# Patient Record
Sex: FEMALE | Race: WHITE | Employment: FULL TIME | URBAN - METROPOLITAN AREA
[De-identification: names, ages, dates, MRNs, and addresses within clinical notes are randomized per-mention and may not be internally consistent; named-entity substitution may affect disease eponyms.]

---
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
## 2019-05-02 ENCOUNTER — RECORDS - HEALTHEAST (OUTPATIENT)
Dept: LAB | Facility: CLINIC | Age: 19
End: 2019-05-02

## 2019-05-03 LAB — 25(OH)D3 SERPL-MCNC: 41.5 NG/ML (ref 30–80)

## 2019-12-10 ENCOUNTER — RECORDS - HEALTHEAST (OUTPATIENT)
Dept: LAB | Facility: CLINIC | Age: 19
End: 2019-12-10

## 2019-12-11 LAB
C TRACH DNA SPEC QL PROBE+SIG AMP: NEGATIVE
N GONORRHOEA DNA SPEC QL NAA+PROBE: NEGATIVE

## 2020-08-18 ENCOUNTER — RECORDS - HEALTHEAST (OUTPATIENT)
Dept: LAB | Facility: CLINIC | Age: 20
End: 2020-08-18

## 2020-08-18 LAB — LEVETIRACETAM (KEPPRA): 14.1 UG/ML (ref 6–46)

## 2021-02-03 ENCOUNTER — RECORDS - HEALTHEAST (OUTPATIENT)
Dept: LAB | Facility: CLINIC | Age: 21
End: 2021-02-03

## 2021-02-05 LAB — BACTERIA SPEC CULT: NORMAL

## 2021-04-12 ENCOUNTER — RECORDS - HEALTHEAST (OUTPATIENT)
Dept: LAB | Facility: CLINIC | Age: 21
End: 2021-04-12

## 2021-04-15 LAB
C TRACH DNA SPEC QL PROBE+SIG AMP: NEGATIVE
N GONORRHOEA DNA SPEC QL NAA+PROBE: NEGATIVE

## 2021-12-19 ENCOUNTER — HOSPITAL ENCOUNTER (EMERGENCY)
Facility: CLINIC | Age: 21
Discharge: HOME | End: 2021-12-19
Attending: EMERGENCY MEDICINE | Admitting: EMERGENCY MEDICINE
Payer: COMMERCIAL

## 2021-12-19 VITALS — BODY MASS INDEX: 19.49 KG/M2 | WEIGHT: 110 LBS | HEIGHT: 63 IN

## 2021-12-19 DIAGNOSIS — G40.909 SEIZURE DISORDER (H): ICD-10-CM

## 2021-12-19 LAB
ANION GAP SERPL CALCULATED.3IONS-SCNC: 9 MMOL/L (ref 3–14)
BASOPHILS # BLD AUTO: 0 10E3/UL (ref 0–0.2)
BASOPHILS NFR BLD AUTO: 0 %
BUN SERPL-MCNC: 10 MG/DL (ref 7–30)
CALCIUM SERPL-MCNC: 8.5 MG/DL (ref 8.5–10.1)
CHLORIDE BLD-SCNC: 111 MMOL/L (ref 94–109)
CO2 SERPL-SCNC: 21 MMOL/L (ref 20–32)
CREAT SERPL-MCNC: 0.63 MG/DL (ref 0.52–1.04)
EOSINOPHIL # BLD AUTO: 0 10E3/UL (ref 0–0.7)
EOSINOPHIL NFR BLD AUTO: 0 %
ERYTHROCYTE [DISTWIDTH] IN BLOOD BY AUTOMATED COUNT: 12.6 % (ref 10–15)
GFR SERPL CREATININE-BSD FRML MDRD: >90 ML/MIN/1.73M2
GLUCOSE BLD-MCNC: 85 MG/DL (ref 70–99)
HCT VFR BLD AUTO: 41.4 % (ref 35–47)
HGB BLD-MCNC: 13.1 G/DL (ref 11.7–15.7)
IMM GRANULOCYTES # BLD: 0 10E3/UL
IMM GRANULOCYTES NFR BLD: 0 %
LYMPHOCYTES # BLD AUTO: 0.6 10E3/UL (ref 0.8–5.3)
LYMPHOCYTES NFR BLD AUTO: 6 %
MCH RBC QN AUTO: 29 PG (ref 26.5–33)
MCHC RBC AUTO-ENTMCNC: 31.6 G/DL (ref 31.5–36.5)
MCV RBC AUTO: 92 FL (ref 78–100)
MONOCYTES # BLD AUTO: 0.5 10E3/UL (ref 0–1.3)
MONOCYTES NFR BLD AUTO: 5 %
NEUTROPHILS # BLD AUTO: 9 10E3/UL (ref 1.6–8.3)
NEUTROPHILS NFR BLD AUTO: 89 %
NRBC # BLD AUTO: 0 10E3/UL
NRBC BLD AUTO-RTO: 0 /100
PLATELET # BLD AUTO: 292 10E3/UL (ref 150–450)
POTASSIUM BLD-SCNC: 4.1 MMOL/L (ref 3.4–5.3)
RBC # BLD AUTO: 4.51 10E6/UL (ref 3.8–5.2)
SODIUM SERPL-SCNC: 141 MMOL/L (ref 133–144)
WBC # BLD AUTO: 10.2 10E3/UL (ref 4–11)

## 2021-12-19 PROCEDURE — 258N000003 HC RX IP 258 OP 636: Performed by: EMERGENCY MEDICINE

## 2021-12-19 PROCEDURE — 80177 DRUG SCRN QUAN LEVETIRACETAM: CPT | Performed by: EMERGENCY MEDICINE

## 2021-12-19 PROCEDURE — 36415 COLL VENOUS BLD VENIPUNCTURE: CPT | Performed by: EMERGENCY MEDICINE

## 2021-12-19 PROCEDURE — 250N000011 HC RX IP 250 OP 636: Performed by: EMERGENCY MEDICINE

## 2021-12-19 PROCEDURE — 99284 EMERGENCY DEPT VISIT MOD MDM: CPT | Mod: 25

## 2021-12-19 PROCEDURE — 85025 COMPLETE CBC W/AUTO DIFF WBC: CPT | Performed by: EMERGENCY MEDICINE

## 2021-12-19 PROCEDURE — 96375 TX/PRO/DX INJ NEW DRUG ADDON: CPT

## 2021-12-19 PROCEDURE — 80048 BASIC METABOLIC PNL TOTAL CA: CPT | Performed by: EMERGENCY MEDICINE

## 2021-12-19 PROCEDURE — 96365 THER/PROPH/DIAG IV INF INIT: CPT

## 2021-12-19 RX ORDER — ONDANSETRON 2 MG/ML
4 INJECTION INTRAMUSCULAR; INTRAVENOUS ONCE
Status: COMPLETED | OUTPATIENT
Start: 2021-12-19 | End: 2021-12-19

## 2021-12-19 RX ORDER — ONDANSETRON 2 MG/ML
4 INJECTION INTRAMUSCULAR; INTRAVENOUS ONCE
Status: DISCONTINUED | OUTPATIENT
Start: 2021-12-19 | End: 2021-12-19 | Stop reason: HOSPADM

## 2021-12-19 RX ADMIN — LEVETIRACETAM 2000 MG: 100 INJECTION, SOLUTION INTRAVENOUS at 17:38

## 2021-12-19 RX ADMIN — ONDANSETRON 4 MG: 2 INJECTION INTRAMUSCULAR; INTRAVENOUS at 17:17

## 2021-12-19 ASSESSMENT — ENCOUNTER SYMPTOMS
SEIZURES: 1
HEADACHES: 1

## 2021-12-19 ASSESSMENT — MIFFLIN-ST. JEOR: SCORE: 1228.09

## 2021-12-19 NOTE — ED TRIAGE NOTES
Patient has hx of epilepsy. Was drinking alcohol last night and threw up this morning. Reports she threw up keppra. Had 1st seizure around 1500 that lasted approximately 2 minutes. 2nd seizure happened at around 1600 that lasted approximately 2 minutes. Mother called EMS after 2nd seizure for transport.

## 2021-12-19 NOTE — ED PROVIDER NOTES
"  History   Chief Complaint:  Seizures       The history is provided by the patient and the EMS personnel.      Solange Pastor is a 21 year old female with history of seizure disorder who presents with seizure. Today around 1500, the patient had a witnessed seizure that lasted around 1-2 minutes. Her roommates then called her mom who picked her up and she then had another seizure. EMS was then called who stated she was in a postictal phase upon arrival. While en route, she was given a dose of Toradol for her headache and was vitally stable. The patient mentions that she had been drinking last night and threw up her Keppra medication this morning. She follows with Dr. Dayana Ramon at [facility] for her seizures.     Review of Systems   Neurological: Positive for seizures and headaches.   All other systems reviewed and are negative.    Allergies:  The patient has no known allergies.     Medications:  Keppra  Lexapro  Estarylla     Past Medical History:     Seizure disorder  Anxiety disorder    Past Surgical History:    The patient has no pertinent surgical history.      Family History:    The patient has no pertinent family history.     Social History:  The patient presents to the ED with her mother. She lives with roommates.     Physical Exam     Patient Vitals for the past 24 hrs:   Height Weight   12/19/21 1643 1.6 m (5' 3\") 49.9 kg (110 lb)       Physical Exam  General/Appearance: appears stated age, well-groomed, appears comfortable  Eyes: EOMI, no scleral injection, no icterus  ENT: MMM  Neck: supple, nl ROM, no stiffness  Cardiovascular: RRR, nl S1S2, no m/r/g, 2+ pulses in all 4 extremities, cap refill <2sec  Respiratory: CTAB, good air movement throughout, no wheezes/rhonchi/rales, no increased WOB, no retractions  MSK: HART, good tone, no bony abnormality  Skin: warm and well-perfused, no rash, no edema, no ecchymosis, nl turgor  Neuro: GCS 15, alert and oriented, no gross focal neuro " deficits  Psych: interacts appropriately  Heme: no petechia, no purpura, no active bleeding        Emergency Department Course   Laboratory:  CBC: WBC 10.2, HGB 13.1,      BMP: chloride 111 (H) o/w WNL (Creatinine 0.63)     Keppra level: pending       Emergency Department Course:  Reviewed:  I reviewed nursing notes, vitals, past medical history, Care Everywhere and [facility]    Assessments:  1634 I obtained history and examined the patient as noted above.   1706 I rechecked the patient to explain her treatment plan.   1728 I spoke with the patient to inform her of her blood work results.     Consults:  1704 I spoke with Dr. Vizcarra, neurologist at [facility], regarding the patient's condition.    Interventions:  1717 Zofran 4 mg IV    1738 Keppra 2000 mg in 250 mL 0.9% NaCl IV    Disposition:  The patient was discharged to home.     Impression & Plan     CMS Diagnoses: None    Medical Decision Making:  This patient is a very pleasant 21-year-old female who is been diagnosed with a seizure disorder in the past year who presents today after 2 witnessed seizures.  She cleared appropriately and did not have prolonged postictal phase.  I suspect the etiology of her seizures is both a combination of drinking last night, relative dehydration, as well as vomiting up this morning's Keppra dose.  I did touch base with the on-call neurologist for her group who recommended that I give her a 2 g IV bolus today.  At this point in time she does not wish to increase her 750 mg twice daily dose but is passing along a note to the patient's neurologist that she was seen here for these 2 episodes today.  Otherwise the patient is neurologically intact.  I doubt intracranial pathology as a contributing focus as she is neurologically intact.  Similarly she has no infectious symptoms.  Electrolytes, most notably sodium, are normal.  She is now received her IV Keppra dose and will be discharged home.    Diagnosis:    ICD-10-CM    1.  Seizure disorder (H)  G40.909        Scribe Disclosure:  I, Anuradha Arik, am serving as a scribe on 12/19/2021 at 4:43 PM to personally document services performed by Xiomara Mcpherson MD based on my observations and the provider's statements to me.            Xiomara Mcpherson MD  12/19/21 1834

## 2021-12-19 NOTE — ED NOTES
Bed: ED19  Expected date: 12/19/21  Expected time: 4:27 PM  Means of arrival: Ambulance  Comments:  435 21f seizure ETA 1123

## 2021-12-21 ENCOUNTER — TELEPHONE (OUTPATIENT)
Dept: EMERGENCY MEDICINE | Facility: CLINIC | Age: 21
End: 2021-12-21
Payer: COMMERCIAL

## 2021-12-21 LAB — LEVETIRACETAM SERPL-MCNC: 8 UG/ML

## 2021-12-21 NOTE — TELEPHONE ENCOUNTER
[facility] Emergency Department/Urgent Care Lab result notification:    Reason for call  Notify of lab results, assess symptoms,  review ED providers recommendations (if necessary) and advise per ED lab result f/u protocol.    Lab result  Final Levetiracetam (Keppra) level is  8 ug/mL and this level is [LOW ].    Normal reference range for Levetiracetam (Keppra) is 12.0 to 46.0 ug/mL  Resulted after [facility] Emergency Dept visit on 12/19/21 (date).  Patient to be notified of result and advised to relay result to their Neurologist or physician who manages the medication dosing.    12:12PM: Left voicemail message requesting a call back to [phone] between 9 a.m. and 5:30 p.m. for patient's ED/UC lab results.      Lisa Ramon RN  [facility] Result RN  [facility] Emergency Dept Lab Result RN  Ph# [phone]

## 2021-12-22 NOTE — TELEPHONE ENCOUNTER
Valerio notified of result and encouraged to relay to her Neurologist.    Haresh Dye RN  [facility]  Emergency Dept Lab Result RN  Ph# [phone]

## 2022-02-20 ENCOUNTER — HEALTH MAINTENANCE LETTER (OUTPATIENT)
Age: 22
End: 2022-02-20

## 2022-10-23 ENCOUNTER — HEALTH MAINTENANCE LETTER (OUTPATIENT)
Age: 22
End: 2022-10-23

## 2022-12-10 ENCOUNTER — HEALTH MAINTENANCE LETTER (OUTPATIENT)
Age: 22
End: 2022-12-10

## 2024-01-20 ENCOUNTER — HEALTH MAINTENANCE LETTER (OUTPATIENT)
Age: 24
End: 2024-01-20

## 2024-12-23 ENCOUNTER — LAB REQUISITION (OUTPATIENT)
Dept: LAB | Facility: CLINIC | Age: 24
End: 2024-12-23

## 2024-12-23 DIAGNOSIS — Z12.4 ENCOUNTER FOR SCREENING FOR MALIGNANT NEOPLASM OF CERVIX: ICD-10-CM

## 2024-12-23 PROCEDURE — G0145 SCR C/V CYTO,THINLAYER,RESCR: HCPCS | Performed by: OBSTETRICS & GYNECOLOGY

## 2024-12-30 LAB
BKR LAB AP GYN ADEQUACY: NORMAL
BKR LAB AP GYN INTERPRETATION: NORMAL
BKR LAB AP HPV REFLEX: NO
BKR LAB AP LMP: NORMAL
BKR LAB AP PREVIOUS ABNL DX: NORMAL
BKR LAB AP PREVIOUS ABNORMAL: NORMAL
PATH REPORT.COMMENTS IMP SPEC: NORMAL
PATH REPORT.COMMENTS IMP SPEC: NORMAL
PATH REPORT.RELEVANT HX SPEC: NORMAL